# Patient Record
(demographics unavailable — no encounter records)

---

## 2024-11-11 NOTE — HEALTH RISK ASSESSMENT
[Yes] : Yes [2 - 3 times a week (3 pts)] : 2 - 3  times a week (3 points) [1 or 2 (0 pts)] : 1 or 2 (0 points) [Never (0 pts)] : Never (0 points) [No] : In the past 12 months have you used drugs other than those required for medical reasons? No [0] : 2) Feeling down, depressed, or hopeless: Not at all (0) [PHQ-2 Negative - No further assessment needed] : PHQ-2 Negative - No further assessment needed [Never] : Never [de-identified] : wine in the evenings  [Audit-CScore] : 3 [de-identified] : Walking, keeping active at home  [de-identified] : Diet is generally good  [CLI6Qcjfy] : 0 [ColonoscopyDate] : 09/2019 [ColonoscopyComments] : Diverticulosis, Internal hemorrhoids, mucosal nodule to distal rectum.

## 2024-11-11 NOTE — ASSESSMENT
[FreeTextEntry1] : Health care maintenance: check blood work, blood drawn in office  follow up with optometry/ophthalmology and dentist for routine exams when due colonoscopy done in 2019- will refer patient to colorectal surgery for updated colonoscopy  follow up with dermatology for skin examination  declines Flu vaccine   Hyperlipidemia: c/w diet and exercise  c/w Atorvastatin  check blood work  Mild MAURICIO: referred to pulmonologist/sleep specialist   Nodule of rectum: referred to colorectal surgery

## 2024-11-11 NOTE — PHYSICAL EXAM
[No Acute Distress] : no acute distress [Well Nourished] : well nourished [Well Developed] : well developed [Well-Appearing] : well-appearing [Normal Sclera/Conjunctiva] : normal sclera/conjunctiva [PERRL] : pupils equal round and reactive to light [Normal Oropharynx] : the oropharynx was normal [Normal TMs] : both tympanic membranes were normal [Normal Appearance] : was normal in appearance [Neck Supple] : was supple [Normal] : the thyroid was normal [No Respiratory Distress] : no respiratory distress  [Clear to Auscultation] : lungs were clear to auscultation bilaterally [Normal Rate] : normal rate  [Regular Rhythm] : with a regular rhythm [Normal S1, S2] : normal S1 and S2 [No Murmur] : no murmur heard [No Carotid Bruits] : no carotid bruits [No Edema] : there was no peripheral edema [Soft] : abdomen soft [Non Tender] : non-tender [Normal Bowel Sounds] : normal bowel sounds [Normal Posterior Cervical Nodes] : no posterior cervical lymphadenopathy [Normal Anterior Cervical Nodes] : no anterior cervical lymphadenopathy [Grossly Normal Strength/Tone] : grossly normal strength/tone [No Rash] : no rash [No Focal Deficits] : no focal deficits [Normal Affect] : the affect was normal [Alert and Oriented x3] : oriented to person, place, and time [Normal Mood] : the mood was normal [de-identified] : multiple nevi on back, chest, abdomen

## 2024-11-11 NOTE — HISTORY OF PRESENT ILLNESS
[FreeTextEntry1] : Annual physical  [de-identified] :  58 year old male presents for an annual physical.  He has Hyperlipidemia- on Atorvastatin   he had a home sleep study done in June 2023 which showed mild obstructive sleep apnea syndrome CPAP was not recommended at the time patient continues to snore and have apneic episodes   had a colonoscopy in 2019 found to have mucosal nodule in the distal rectum- had been advised to see colorectal surgeon has not seen a colorectal surgeon yet

## 2024-12-02 NOTE — ASSESSMENT
[FreeTextEntry1] : Mr Sanchez presents to the office for discussion of colonoscopy  The risks/benefits/alternatives for a colonoscopy were discussed. These include a less than 1% risk of bleeding should any polyps be biopsied and/or removed. There is also a less than 0.1% risk of perforation. The patient understands the need to adhere to a clear liquid diet the day prior to procedure as well as having to perform a bowel prep in order to allow for adequate visualization of the mucosal surfaces.  Followup colonoscopies will be scheduled based on the findings that are seen at the time of the procedure. Patient understands and is agreeable, and will proceed with consent and scheduling. In addition, he has a history of prolapsing internal hemorrhoids.  In office today, this was well-visualized on anoscopy.  Options for serial rubber band ligation versus hemorrhoidectomy were discussed.  Patient is interested in proceeding, but will have colonoscopy completed first before committing to surgical hemorrhoidectomy.  All questions and concerns were addressed.

## 2024-12-02 NOTE — HISTORY OF PRESENT ILLNESS
[FreeTextEntry1] : Mr Daniel presents to the office for consultation. He underwent on 9/16/2019 screening colonoscopy at [facility]. At that time, there was found to be diverticulosis and a mucosal nodule at dentate line. This site was biopsied and he was advised to followup with [facility]. No pathology results are available for me to review and patient did not see [facility] at that time. Here now with reports of mucosal hemorrhoidal prolpase. Admits to sitting on toilet several times daily and sometimes for quite some time. BMs can be passed anywhere from 1-3 times per day. Works as a  so regularly lifts heavy items.

## 2024-12-02 NOTE — PHYSICAL EXAM
[Normal rectal exam] : exam was normal [Skin Tags] : there were no residual hemorrhoidal skin tags seen [Normal] : was normal [None] : there was no rectal mass  [Gross Blood] : no gross blood [No Rash or Lesion] : No rash or lesion [Alert] : alert [Oriented to Person] : oriented to person [Oriented to Place] : oriented to place [Oriented to Time] : oriented to time [Calm] : calm [de-identified] : large and engorged IH with overlying trauma from chronic prolapse [de-identified] : No apparent distress [de-identified] : Normocephalic atraumatic [de-identified] : Moving all extremities x 4